# Patient Record
Sex: MALE | Race: WHITE | NOT HISPANIC OR LATINO | Employment: OTHER | URBAN - METROPOLITAN AREA
[De-identification: names, ages, dates, MRNs, and addresses within clinical notes are randomized per-mention and may not be internally consistent; named-entity substitution may affect disease eponyms.]

---

## 2017-02-05 ENCOUNTER — COMMUNICATION - RIVER FALLS (OUTPATIENT)
Dept: FAMILY MEDICINE | Facility: CLINIC | Age: 62
End: 2017-02-05

## 2017-02-05 ENCOUNTER — OFFICE VISIT - RIVER FALLS (OUTPATIENT)
Dept: FAMILY MEDICINE | Facility: CLINIC | Age: 62
End: 2017-02-05

## 2017-09-27 ENCOUNTER — OFFICE VISIT - RIVER FALLS (OUTPATIENT)
Dept: FAMILY MEDICINE | Facility: CLINIC | Age: 62
End: 2017-09-27

## 2017-09-27 ASSESSMENT — MIFFLIN-ST. JEOR: SCORE: 1590.51

## 2017-10-05 ENCOUNTER — AMBULATORY - RIVER FALLS (OUTPATIENT)
Dept: FAMILY MEDICINE | Facility: CLINIC | Age: 62
End: 2017-10-05

## 2017-10-10 ENCOUNTER — AMBULATORY - RIVER FALLS (OUTPATIENT)
Dept: FAMILY MEDICINE | Facility: CLINIC | Age: 62
End: 2017-10-10

## 2017-10-11 LAB
CHOLEST SERPL-MCNC: 200 MG/DL
CHOLEST/HDLC SERPL: 3.3 {RATIO}
CREAT SERPL-MCNC: 0.91 MG/DL (ref 0.7–1.25)
GLUCOSE BLD-MCNC: 98 MG/DL (ref 65–99)
HDLC SERPL-MCNC: 61 MG/DL
LDLC SERPL CALC-MCNC: 123 MG/DL
NONHDLC SERPL-MCNC: 139 MG/DL
TRIGL SERPL-MCNC: 65 MG/DL

## 2017-12-19 ENCOUNTER — OFFICE VISIT - RIVER FALLS (OUTPATIENT)
Dept: FAMILY MEDICINE | Facility: CLINIC | Age: 62
End: 2017-12-19

## 2018-04-13 ENCOUNTER — AMBULATORY - RIVER FALLS (OUTPATIENT)
Dept: FAMILY MEDICINE | Facility: CLINIC | Age: 63
End: 2018-04-13

## 2018-06-22 ENCOUNTER — AMBULATORY - RIVER FALLS (OUTPATIENT)
Dept: FAMILY MEDICINE | Facility: CLINIC | Age: 63
End: 2018-06-22

## 2018-08-02 ENCOUNTER — AMBULATORY - RIVER FALLS (OUTPATIENT)
Dept: FAMILY MEDICINE | Facility: CLINIC | Age: 63
End: 2018-08-02

## 2018-09-27 ENCOUNTER — AMBULATORY - RIVER FALLS (OUTPATIENT)
Dept: FAMILY MEDICINE | Facility: CLINIC | Age: 63
End: 2018-09-27

## 2019-04-29 ENCOUNTER — OFFICE VISIT - RIVER FALLS (OUTPATIENT)
Dept: FAMILY MEDICINE | Facility: CLINIC | Age: 64
End: 2019-04-29

## 2019-10-11 ENCOUNTER — AMBULATORY - RIVER FALLS (OUTPATIENT)
Dept: FAMILY MEDICINE | Facility: CLINIC | Age: 64
End: 2019-10-11

## 2020-09-15 ENCOUNTER — AMBULATORY - RIVER FALLS (OUTPATIENT)
Dept: FAMILY MEDICINE | Facility: CLINIC | Age: 65
End: 2020-09-15

## 2021-09-14 ENCOUNTER — COMMUNICATION - RIVER FALLS (OUTPATIENT)
Dept: FAMILY MEDICINE | Facility: CLINIC | Age: 66
End: 2021-09-14

## 2021-09-28 ENCOUNTER — AMBULATORY - RIVER FALLS (OUTPATIENT)
Dept: FAMILY MEDICINE | Facility: CLINIC | Age: 66
End: 2021-09-28

## 2021-11-02 ENCOUNTER — COMMUNICATION - RIVER FALLS (OUTPATIENT)
Dept: FAMILY MEDICINE | Facility: CLINIC | Age: 66
End: 2021-11-02

## 2021-11-08 ENCOUNTER — LAB REQUISITION (OUTPATIENT)
Dept: LAB | Facility: CLINIC | Age: 66
End: 2021-11-08
Payer: COMMERCIAL

## 2021-11-08 ENCOUNTER — OFFICE VISIT - RIVER FALLS (OUTPATIENT)
Dept: FAMILY MEDICINE | Facility: CLINIC | Age: 66
End: 2021-11-08

## 2021-11-08 DIAGNOSIS — U07.1 COVID-19: ICD-10-CM

## 2021-11-08 PROCEDURE — U0005 INFEC AGEN DETEC AMPLI PROBE: HCPCS | Mod: ORL | Performed by: EMERGENCY MEDICINE

## 2021-11-08 ASSESSMENT — MIFFLIN-ST. JEOR: SCORE: 1603.54

## 2021-11-09 ENCOUNTER — COMMUNICATION - RIVER FALLS (OUTPATIENT)
Dept: FAMILY MEDICINE | Facility: CLINIC | Age: 66
End: 2021-11-09

## 2021-11-10 ENCOUNTER — COMMUNICATION - RIVER FALLS (OUTPATIENT)
Dept: FAMILY MEDICINE | Facility: CLINIC | Age: 66
End: 2021-11-10

## 2021-11-10 LAB — SARS-COV-2 RNA RESP QL NAA+PROBE: NOT DETECTED

## 2021-11-11 LAB — SARS-COV-2 RNA RESP QL NAA+PROBE: NEGATIVE

## 2021-11-16 ENCOUNTER — COMMUNICATION - RIVER FALLS (OUTPATIENT)
Dept: FAMILY MEDICINE | Facility: CLINIC | Age: 66
End: 2021-11-16

## 2021-11-18 ENCOUNTER — COMMUNICATION - RIVER FALLS (OUTPATIENT)
Dept: FAMILY MEDICINE | Facility: CLINIC | Age: 66
End: 2021-11-18

## 2021-11-19 ENCOUNTER — COMMUNICATION - RIVER FALLS (OUTPATIENT)
Dept: FAMILY MEDICINE | Facility: CLINIC | Age: 66
End: 2021-11-19

## 2021-11-22 ENCOUNTER — COMMUNICATION - RIVER FALLS (OUTPATIENT)
Dept: FAMILY MEDICINE | Facility: CLINIC | Age: 66
End: 2021-11-22

## 2021-11-23 ENCOUNTER — COMMUNICATION - RIVER FALLS (OUTPATIENT)
Dept: FAMILY MEDICINE | Facility: CLINIC | Age: 66
End: 2021-11-23

## 2021-12-02 ENCOUNTER — COMMUNICATION - RIVER FALLS (OUTPATIENT)
Dept: FAMILY MEDICINE | Facility: CLINIC | Age: 66
End: 2021-12-02

## 2021-12-08 ENCOUNTER — COMMUNICATION - RIVER FALLS (OUTPATIENT)
Dept: FAMILY MEDICINE | Facility: CLINIC | Age: 66
End: 2021-12-08

## 2021-12-13 ENCOUNTER — COMMUNICATION - RIVER FALLS (OUTPATIENT)
Dept: FAMILY MEDICINE | Facility: CLINIC | Age: 66
End: 2021-12-13

## 2021-12-14 ENCOUNTER — AMBULATORY - RIVER FALLS (OUTPATIENT)
Dept: FAMILY MEDICINE | Facility: CLINIC | Age: 66
End: 2021-12-14

## 2021-12-15 ENCOUNTER — COMMUNICATION - RIVER FALLS (OUTPATIENT)
Dept: FAMILY MEDICINE | Facility: CLINIC | Age: 66
End: 2021-12-15

## 2021-12-15 LAB
CHOLEST SERPL-MCNC: 189 MG/DL
CHOLEST/HDLC SERPL: 3.8 {RATIO}
GLUCOSE BLD-MCNC: 102 MG/DL (ref 65–99)
HDLC SERPL-MCNC: 50 MG/DL
LDLC SERPL CALC-MCNC: 119 MG/DL
NONHDLC SERPL-MCNC: 139 MG/DL
TRIGL SERPL-MCNC: 102 MG/DL

## 2021-12-17 ENCOUNTER — COMMUNICATION - RIVER FALLS (OUTPATIENT)
Dept: FAMILY MEDICINE | Facility: CLINIC | Age: 66
End: 2021-12-17

## 2021-12-20 ENCOUNTER — AMBULATORY - RIVER FALLS (OUTPATIENT)
Dept: FAMILY MEDICINE | Facility: CLINIC | Age: 66
End: 2021-12-20

## 2021-12-21 ENCOUNTER — COMMUNICATION - RIVER FALLS (OUTPATIENT)
Dept: FAMILY MEDICINE | Facility: CLINIC | Age: 66
End: 2021-12-21

## 2021-12-21 LAB — SARS-COV-2 AB SERPL IA-ACNC: >150 [IU]/ML

## 2022-02-12 VITALS
OXYGEN SATURATION: 96 % | HEART RATE: 78 BPM | WEIGHT: 193.8 LBS | DIASTOLIC BLOOD PRESSURE: 80 MMHG | SYSTOLIC BLOOD PRESSURE: 132 MMHG | TEMPERATURE: 98.3 F

## 2022-02-12 VITALS
WEIGHT: 191.1 LBS | DIASTOLIC BLOOD PRESSURE: 78 MMHG | BODY MASS INDEX: 30.71 KG/M2 | OXYGEN SATURATION: 98 % | TEMPERATURE: 97.6 F | HEART RATE: 64 BPM | SYSTOLIC BLOOD PRESSURE: 130 MMHG | HEIGHT: 66 IN

## 2022-02-12 VITALS
HEIGHT: 67 IN | DIASTOLIC BLOOD PRESSURE: 72 MMHG | SYSTOLIC BLOOD PRESSURE: 116 MMHG | WEIGHT: 185.6 LBS | HEART RATE: 64 BPM | BODY MASS INDEX: 29.13 KG/M2

## 2022-02-15 NOTE — TELEPHONE ENCOUNTER
---------------------  From: John REAGAN, Diana ROSALES (Phone Messages Pool ([id number]_[facility]))   To: MARYANN FOSTER    Sent: 11/10/2021 3:20:09 PM CST  Subject: RE: COVID Test Results     Dr. Aleksey Ha is personally keeping an eye out for your COVID result. As of 20 minutes ago, there was no result yet.    I'm sorry for the delay,    Diana Lopez RN      ---------------------  From: MARYANN FOSTER  To: [facility]  Sent: 11/10/2021 02:55 p.m. CST  Subject: COVID Test Results  Hello:    I called earlier to inquire about the results of a COVID test on had administered on Monday, Nov. 8.  A nurse was going to look into it as she felt the results should be available by now.  Could you please confirm?    Thank you!

## 2022-02-15 NOTE — PROGRESS NOTES
Patient:   MARYANN FOSTER            MRN: [id number]            FIN: [id number]               Age:   61 years     Sex:  Male     :  1955   Associated Diagnoses:   Cold virus   Author:   Allan Santiago MD      Chief Complaint   2017 11:06 AM CST    Cold/URI x7 days.     Chief complaint and symptoms noted above confirmed with patient.      History of Present Illness             The patient presents with nasal congestion.  The location is both sides.  There were exacerbating factors.  It is described as a sensation of pressure.  The symptom occurs intermittently.  The course is worsening.        Review of Systems   Constitutional:  Fever.    Ear/Nose/Mouth/Throat:  Nasal congestion.    Respiratory:  Cough, No wheezing.       Health Status   Allergies:    Allergic Reactions (Selected)  No Known Medication Allergies   Medications:  (Selected)      Problem list:    No problem items selected or recorded.      Histories   Past Medical History:    No active or resolved past medical history items have been selected or recorded.   Family History:    No family history items have been selected or recorded.   Procedure history:    No active procedure history items have been selected or recorded.   Social History:             No active social history items have been recorded.      Physical Examination   Vital Signs   2017 11:06 AM CST Temperature Tympanic 98.3 DegF    Peripheral Pulse Rate 78 bpm    Systolic Blood Pressure 132 mmHg    Diastolic Blood Pressure 80 mmHg    Mean Arterial Pressure 97 mmHg    Oxygen Saturation 96 %      Measurements from flowsheet : Measurements   2017 11:06 AM CST    Weight Measured - Standard                193.8 lb     General:  Alert and oriented.    HENT:  Normocephalic.    Neck:  Supple, Non-tender, No lymphadenopathy.    Respiratory:  Lungs are clear to auscultation.    Cardiovascular:  Normal rate, Regular rhythm.       Impression and Plan   Diagnosis     Cold virus  ([id number]).     Plan:       Refer to: Reviewed benadryl dosing for symptom relief.    Orders     Orders   Requests (Lab):  Influenza A and B Antigen (Request) (Order): Priority: Urgent, Cold virus.     Orders     F/U in 48-72 hours if not improving, sooner if getting worse.

## 2022-02-15 NOTE — NURSING NOTE
Comprehensive Intake Entered On:  11/8/2021 8:13 AM CST    Performed On:  11/8/2021 8:05 AM CST by Nidia Raymond CMA               Summary   Chief Complaint :   Yearly px, is needing COVID test for upcoming surgery 11/11/21   Nidia Raymond CMA - 11/8/2021 8:05 AM CST   Weight Measured :   191.1 lb(Converted to: 191 lb 2 oz, 86.681 kg)      Height Measured :   66.25 in(Converted to: 5 ft 6 in, 168.27 cm)      Body Mass Index :   30.61 kg/m2 (HI)      Body Surface Area :   2.01 m2   Nidia Raymond CMA - 11/10/2021 1:08 PM CST     Systolic Blood Pressure :   130 mmHg   Diastolic Blood Pressure :   78 mmHg   Mean Arterial Pressure :   95 mmHg   Peripheral Pulse Rate :   64 bpm   BP Site :   Right arm   Pulse Site :   Radial artery   BP Method :   Manual   HR Method :   Electronic   Temperature Tympanic :   97.6 DegF(Converted to: 36.4 DegC)  (LOW)    Oxygen Saturation :   98 %   Segundo GIBSON Nidia - 11/8/2021 8:05 AM CST   Health Status   Allergies Verified? :   Yes   Medication History Verified? :   Yes   Medical History Verified? :   No   Pre-Visit Planning Status :   Completed   Tobacco Use? :   Former smoker   Nidia Raymond CMA - 11/8/2021 8:05 AM CST   Consents   Consent for Immunization Exchange :   Consent Granted   Consent for Immunizations to Providers :   Consent Granted   Segundo GIBSON Nidia - 11/8/2021 8:05 AM CST   Meds / Allergies   (As Of: 11/8/2021 8:13:45 AM CST)   Allergies (Active)   No known allergies  Estimated Onset Date:   Unspecified ; Created By:   Delmy Shaver CMA; Reaction Status:   Active ; Category:   Drug ; Substance:   No known allergies ; Type:   Allergy ; Updated By:   Delmy Shaver CMA; Reviewed Date:   11/8/2021 8:05 AM CST      No Known Medication Allergies  Estimated Onset Date:   Unspecified ; Created By:   Gabby Jovel; Reaction Status:   Active ; Category:   Drug ; Substance:   No Known Medication Allergies ; Type:   Allergy ; Updated By:   Gabby Jovel;  Reviewed Date:   11/8/2021 8:05 AM CST        Medication List   (As Of: 11/8/2021 8:13:45 AM CST)   Home Meds    aspirin  :   aspirin ; Status:   Documented ; Ordered As Mnemonic:   aspirin 81 mg oral delayed release tablet ; Simple Display Line:   81 mg, 1 tab(s), po, daily, 0 Refill(s) ; Catalog Code:   aspirin ; Order Dt/Tm:   9/27/2017 4:06:29 PM CDT            Social History   Social History   (As Of: 11/8/2021 8:13:45 AM CST)   Alcohol:        Current, 3-5 times per week, 1 drinks/episode average.  2 drinks/episode maximum.   (Last Updated: 9/29/2017 8:36:27 AM CDT by Gale Abraham)          Tobacco:        Former smoker, quit more than 30 days ago, Cigarettes   (Last Updated: 11/8/2021 8:05:45 AM CST by Nidia Raymond CMA)          Electronic Cigarette/Vaping:        Electronic Cigarette Use: Never.   (Last Updated: 11/8/2021 8:05:49 AM CST by Nidia Raymond CMA)          Substance Abuse:        Never.   (Last Updated: 11/8/2021 7:08:14 AM CST by Nidia Raymond CMA)          Employment/School:        Employed, Work/School description: Education.   (Last Updated: 9/29/2017 8:36:03 AM CDT by Gale Abraham)          Home/Environment:        Risks in environment: owns secured gun.   (Last Updated: 9/29/2017 8:36:19 AM CDT by Gale Abraham)          Nutrition/Health:        Type of diet: Regular.  Vitamin/Supplements: Multivitamin Male 50+.  Previous weight loss programs: Atkins-low carb variety.   (Last Updated: 11/8/2021 7:08:13 AM CST by Nidia Raymond CMA)          Exercise:        Exercise duration: 35.  Exercise frequency: 5-6 times/week.  Self assessment: Good condition.  Exercise type: Walking.   (Last Updated: 11/8/2021 7:08:12 AM CST by Nidia Raymond CMA)          Sexual:        Sexually active: Yes.  Sexual orientation: Straight or heterosexual.  Contraceptive Use Details: vasectomy.   (Last Updated: 9/29/2017 8:37:16 AM CDT by Gale Abraham)

## 2022-02-15 NOTE — TELEPHONE ENCOUNTER
---------------------  From: Anita Simon LPN (Phone Messages Pool ([id number]_[facility]))   To: Appointment Pool ([id number]_[place]);     Sent: 11/19/2021 9:46:37 AM CST  Subject: FW: COVID Booster Shot           ---------------------  From: MARYANN FOSTER  To: [facility]  Sent: 11/19/2021 09:34 a.m. CST  Subject: FW: COVID Booster Shot  Hello:    As my earlier message indicated, Dr. Ryder recommended that I receive a COVID booster as soon as possible due to my medical condition.    I called earlier today and learned that the first available appointment at your clinic would be December 3rd at 11 am.  However, I was able to schedule an appointment for a booster shot through [facility] for 11/22/21.  Therefore, please cancel my appointment on 12/3/21.    Thank you!  ---------------------  From: MARYANN FOSTER  To: [facility]  Sent: 11/18/2021 06:38 p.m. CST  Subject: COVID Booster Shot  Hello:  Dr. Ryder has recommended that I receive a COVID booster as soon as possible.  Please advise on when this could be scheduled.  I m mostly available on Friday--except 3-4:30 pm and Monday afternoon.  Thank you!Appointment cancelled.

## 2022-02-15 NOTE — TELEPHONE ENCOUNTER
---------------------  From: Nidia Raymond CMA (Phone Messages Pool ([id number]_[place]))   To: Appointment Pool ([id number]_[place]);     Sent: 11/19/2021 7:57:08 AM CST  Subject: FW: COVID Booster Shot         Please help pt schedule a COVID booster per [facility]  ---------------------  From: MARYANN FOSTER  To: [facility]  Sent: 11/18/2021 06:38 p.m. CST  Subject: COVID Booster Shot  Adonis:    Dr. Ryder has recommended that I receive a COVID booster as soon as possible.  Please advise on when this could be scheduled.  I m mostly available on Friday--except 3-4:30 pm and Monday afternoon.    Thank you!LVM x1 for pt to call and sched

## 2022-02-15 NOTE — TELEPHONE ENCOUNTER
---------------------  From: Jami Shukla RN (Phone Messages Pool ([id number]_[place]))   To: Appointment Pool ([id number]_[place]);     Sent: 11/9/2021 8:51:37 AM CST  Subject: CONSUMER MESSAGE  FW: Assign requested care physician           ---------------------  From: MARYANN FOSTER  To: [facility]  Sent: 11/09/2021 08:46 a.m. CST  Subject: Assign requested care physician  Hello:    Could you please assign Dr. Bill Ryder as my care physician in my records?  Thank you.Done

## 2022-02-15 NOTE — TELEPHONE ENCOUNTER
---------------------From: Jami Shukla RN (Phone Messages Pool ([id number]_[facility])) To: [facility] Message Pool ([id number]_[facility]);   Sent: 11/18/2021 11:41:47 AM CSTSubject: CONSUMER MESSAGE FW: Melanoma Pathology Report 11- ---------------------From: MARYANN FOSTERTo: [facility] CenterSent: 11/18/2021 11:26 a.m. CSTSubject: Melanoma Pathology Report [id number] forward attached pathology report to Dr. Bill Ryder.  He can call me at his convenience to discuss at [phone].Thank you.---------------------From: Nidia Raymond CMA (Segment Message Pool ([id number]_[facility])) To: Bill Ryder MD;   Sent: 11/18/2021 12:03:50 PM CSTSubject: FW: CONSUMER MESSAGE FW: Melanoma Pathology Report 11----------------------From: Bill Ryder MD To: Nidia Raymond CMA;   Sent: 11/18/2021 3:59:55 PM CSTSubject: RE: CONSUMER MESSAGE FW: Melanoma Pathology Report 11- Should be good for a booster and patient will schedule---------------------From: Nidia Raymond CMA To: MARYANN FOSTER  Sent: 11/18/2021 4:53:25 PM CSTSubject: FW: CONSUMER MESSAGE FW: Melanoma Pathology Report 11-

## 2022-02-15 NOTE — PROGRESS NOTES
Patient:   MARYANN FOSTER            MRN: [id number]            FIN: [id number]               Age:   66 years     Sex:  Male     :  1955   Associated Diagnoses:   Annual physical exam; Melanoma   Author:   Bill Ryder MD      Chief Complaint   2021 8:05 AM CST    Yearly px, is needing COVID test for upcoming surgery 21        Well Adult History     Has been getting annual skin exams and has several moles removed in past for the past 20 years. Had skin exam October with a biopsy returning as melanoma with deep margin positive. Currently considered T2aN0 or Stage Ib Melanoma but further testing to be done including sentinel lymph node. Planned for wide margin resection with sentinel lymph node as well.  Colonoscopy with 5mm tubular adenoma 2017  Partially detached retina 2018 right eye. Still has some floaters.    Denies heartburn (had it in his 40's)  Alcohol about 10 drinks a week  Nonsmoker  Takes fish oil      Review of Systems   Constitutional:  No fever, No weakness, No fatigue.    Eye:  No recent visual problem.    Ear/Nose/Mouth/Throat:  No ear pain, No tinnitus.    Respiratory:  No shortness of breath, No cough, No wheezing.    Cardiovascular:  No chest pain, No peripheral edema.    Gastrointestinal:  No nausea, No vomiting, No diarrhea.    Genitourinary:  No dysuria, No change in urine stream.    Hematology/Lymphatics:  No bruising tendency, No bleeding tendency.    Endocrine:  Negative.    Immunologic:  Negative.    Musculoskeletal:  No joint pain, No decreased range of motion.    Integumentary:  No rash.    Neurologic:  No numbness, No headache.    Psychiatric:  Negative.    All other systems reviewed and negative   Genitourinary: seldom nocturia. No problems with intercourse    No history of bleeding disorders or blood transfusion  No prior problems with anesthesia  No family history of anesthesia problems  No positive responses for sleep apnea  Denies plavix,  coumadin  Denies history of DVT/PE  Denies recent corticosteroid use    Able to walk a flight of stairs without chest pain or shortness of breath.      Health Status   Allergies:    Allergic Reactions (Selected)  No known allergies  No Known Medication Allergies   Medications:  (Selected)   Documented Medications  Documented  aspirin 81 mg oral delayed release tablet: 1 tab(s) ( 81 mg ), po, daily, 0 Refill(s), Type: Maintenance   Problem list:    No problem items selected or recorded.      Histories   Procedure history:    Colonoscopy (SNOMED CT 331931995) performed by Bill Ryder MD on 2017 at 62 Years.  Comments:  2017 2:31 PM CST - Acacia Vogel  Sedation:  Fentanyl and Versed  Polyps:  Tubular adenoma  F/u in 5 years  Hernia repair (SNOMED CT 70878868) in  at 50 Years.     Family History: Dad  2014 age 88 from  Pancreatic cancer. Mom alive. Brother and sons healthy  Social History:  (Verna) . 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 Had worked at [phone]. Son (Manuel) living in [place], [place] working for [employer] () and  with two dogs. Son (Hansel) living [place], [place] single as . Son Santino) lives in [place] in relationship with one daughter working at [employer]      Physical Examination   Vital Signs   2021 8:05 AM CST Temperature Tympanic 97.6 DegF  LOW    Peripheral Pulse Rate 64 bpm    Pulse Site Radial artery    HR Method Electronic    Systolic Blood Pressure 130 mmHg    Diastolic Blood Pressure 78 mmHg    Mean Arterial Pressure 95 mmHg    BP Site Right arm    BP Method Manual    Oxygen Saturation 98 %      Measurements from flowsheet : Measurements   2021 8:05 AM CST Height Measured - Standard 67 in    Weight Measured - Standard 66.25 lb    BSA 1.19 m2    Body Mass Index 10.38 kg/m2  LOW      General:  Alert and oriented, No acute distress.    Eye:  Normal conjunctiva.     HENT:  Tympanic membranes are clear, No pharyngeal erythema.    Neck:  No lymphadenopathy.    Respiratory:  Lungs are clear to auscultation.    Cardiovascular:  Normal rate, Regular rhythm, No murmur, No edema.    Gastrointestinal:  Soft, Non-tender, Non-distended.    Genitourinary:  Normal genitalia for age and sex.    Musculoskeletal:  Normal range of motion, Normal strength, No tenderness, Normal gait.    Integumentary:  Warm, No rash.    Neurologic:  Alert, Oriented, Normal sensory, Normal motor function, No focal deficits.    Psychiatric:  Cooperative, Appropriate mood & affect.       Impression and Plan   Diagnosis     Annual physical exam (UJS95-UA Z00.00).     Melanoma (IUC57-GJ C43.9).       No restrictions for planned surgery this week  Melanoma: planned wide excision and sentinel lymph node dissection  Cardiac: fasting labs. Discussed preventative aspirin and will discontinue for now  Colon Cancer Screen: Colonoscopy with small tubular adenoma 5mm and recommend repeat December 2025 given new guidelines  Prostate Cancer Screen: no family history. Discussed and declines. Handouts given  Immunizations: Coronavirus 2021. Prevnar 2017. Shingrix 2018. Adacel 2009 and will update after surgery.   Weight: stable from 2017    Last dose aspirin Saturday November 6th  Stop supplements before surgery

## 2022-02-15 NOTE — TELEPHONE ENCOUNTER
---------------------  From: Karli Jason RN (Phone Messages Pool ([id number]_[facility]))   To: Appointment Pool ([id number]_[place]);     Sent: 12/2/2021 3:43:42 PM CST  Subject: FW: FW: Adacel Vaccine     Please assist pt with scheduling procedure shot around 8:30 on 12/14/21.      ---------------------  From: MARYANN FOSTER  To: [facility]  Sent: 12/02/2021 03:32 p.m. CST  Subject: RE: FW: Adacel Vaccine  Could I request that I receive this vaccination when I come to an existing appointment for blood lab work at 8:30 am on December 14th?       Addendum by Karli Jason RN on December 02, 2021 3:15:08 PM CST  ---------------------  From: Karli Jason RN (Phone Messages Pool ([id number]_[place] [facility]))  To: MARYANN FOSTER  Sent: 12/2/2021 3:15:08 PM CST  Subject: FW: Adacel Vaccine       Addendum by Maykel Woods MD on December 02, 2021 3:09:27 PM CST  ---------------------  From: Maykel Woods MD  To: Phone Messages Pool ([id number] [facility] [place]);  Sent: 12/2/2021 3:09:27 PM CST  Subject: RE: Adacel Vaccine       Yes, we would still advise TDAP vaccination every 10 yrs beyond age of 64.  The 10 yr frequency is primarily directed toward tetanus updates, though nearly all of tetanus vaccinations are combined into a tetanus, pertussis, diphtheria  product (TDAP) and its simply easier to offer. Additionally, waning immunity related to pertussis has been observed in adult populations and additional boosters have been beneficial to reduce risk of pertussis infections.  ---------------------  From: Karli Jason RN (Phone Messages Pool ([id number]_[place] [facility] [place]))  To: Maykel Woods MD;  Sent: 12/2/2021 2:37:05 PM CST  Subject: FW: Adacel Vaccine       Donnell Lamb,       Dr. Ryder will be out of the clinic through Dec 8th. Your message is being forwarded to a provider in the clinic for review and advise on the  Adacel vaccine.       Thank you,  Karli DE LA TORRE RN            ---------------------  From: MARYANN  "RCISTIAN  To: [facility]  Sent: 12/02/2021 02:32 p.m. CST  Subject: Adacel Vaccine  Hello:  As a follow up to a recent physical that Dr. Ryder performed, he suggested that I receive the Adacel vaccine to up-date my immunizations. However, I note that the FDA offers the following guidance: \"Immunization for prevention of tetanus, diphtheria and pertussis as a single dose in persons 10 through 64 years of age.\"  I guess my question is do I still need this vaccination as I have reached 66 years of age?  Please advise.  Thank you!Called and LVM for Pt to call back for scheduling.  "

## 2022-02-15 NOTE — TELEPHONE ENCOUNTER
---------------------  From: Gale Taylor (Appointment Pool ([id number]_[place]))   To: MARYANN FOSTER    Sent: 2021 8:55:10 AM CDT  Subject: RE: Appointment Request     Good Morning Maryann,  I have you scheduled for your flu shot on 2021 at 9:40am. If this day and time does not work for you please let us know.  Thank you and have a great day.  Gale       ---------------------  From: MARYANN FOSTER  To: [facility]  Sent: 2021 08:14 a.m. CDT  Subject: Appointment Request  Patient Name: MARYANN FOSTER  Patient : 1955  Appointment Dates: Between Sep 20, 2021 and Oct 01, 2021  Preferred Days: Tuesday,Wednesday,Thursday  Preferred Time: Morning after 8 am or afternoon before 4:30 pm  Contact Patient by: Phone - [phone]    Reason for Visit:    Annual flu shot

## 2022-02-15 NOTE — TELEPHONE ENCOUNTER
---------------------  From: Eileen/Praveena BURLESON (Phone Messages Pool ([id number]_[facility]))   To: [facility] [employer] Pool ([id number]_[place] - [place]);     Sent: 12/17/2021 8:07:59 AM CST  Subject: FW: Antibody Testing           ---------------------  From: MARYANN FOSTER  To: [facility]  Sent: 12/16/2021 09:26 p.m. CST  Subject: Antibody Testing  Please ask Dr. Bill Ryder if he can prescribe an antibody test (e.g., [facility]) for me.  Given my CLL/SLL condition, it is recommended to determine of the COVID vaccinations I have received have generated a sufficient level of antibodies to protect me from infection. Thank you.---------------------  From: Nidia Raymond CMA ([employer] Message Pool ([id number]_[facility]))   To: Bill Ryder MD;     Sent: 12/17/2021 8:44:38 AM CST  Subject: FW: Antibody Testing---------------------  From: Bill Ryder MD   To: Nidia Raymond CMA;     Sent: 12/17/2021 1:25:09 PM CST  Subject: RE: Antibody Testing     Tomasa  Please place the order  Thanksorder placed please call and schedule a lab only apt---------------------  From: Nidia Raymond CMA   To: MARYANN FOSTER    Sent: 12/17/2021 2:27:50 PM CST  Subject: FW: Antibody Testing

## 2022-02-15 NOTE — TELEPHONE ENCOUNTER
---------------------  From: Bill Ryder MD   To: MARYANN FOSTER    Sent: 12/21/2021 2:08:51 PM CST  Subject: Lab     Dear Mr Simmons    You have antibodies to Coronavirus      Results:  Date Result Name Ind Value Ref Range   12/20/2021 2:30 PM Coronavirus (COVID-19) Ab IgG ((H)) >150.00 ( - <1.00)     Francisco Ryder M.D.

## 2022-02-15 NOTE — TELEPHONE ENCOUNTER
"---------------------  From: MARYANN FOSTER  To: [facility]  Sent: 12/02/2021 03:17 p.m. CST  Subject: RE: FW: Adacel Vaccine  Okay.  Thank you for the clarification.  ???  Addendum by Karli Jason RN on December 02, 2021 3:15:08 PM CST  ---------------------  From: Karli Jason RN ([id number])  To: MARYANN FOSTER  Sent: 12/2/2021 3:15:08 PM CST  Subject: FW: Adacel Vaccine  ???  Addendum by Maykel Woods MD on December 02, 2021 3:09:27 PM CST  ---------------------  From: Maykel Woods MD  To: [facility] [employer] Pool ([id number]);  Sent: 12/2/2021 3:09:27 PM CST  Subject: RE: Adacel Vaccine  ???  Yes, we would still advise TDAP vaccination every 10 yrs beyond age of 64.  The 10 yr frequency is primarily directed toward tetanus updates, though nearly all of tetanus vaccinations are combined into a tetanus, pertussis, diphtheria  product (TDAP) and its simply easier to offer. Additionally, waning immunity related to pertussis has been observed in adult populations and additional boosters have been beneficial to reduce risk of pertussis infections.  ---------------------  From: Karli Jason RN (Phone [employer] Pool ([id number]))  To: Maykel Woods MD;  Sent: 12/2/2021 2:37:05 PM CST  Subject: FW: Adacel Vaccine  ???  Donnell Lamb,  ???  Dr. Ryder will be out of the clinic through Dec 8th. Your message is being forwarded to a provider in the clinic for review and advise on the  Adacel vaccine.  ???  Thank you,  Krali DE LA TORRE RN  ???  ???  ---------------------  From: MARYANN FOSTER  To: [facility]  Sent: 12/02/2021 02:32 p.m. CST  Subject: Adacel Vaccine  Hello:  As a follow up to a recent physical that Dr. Ryder performed, he suggested that I receive the Adacel vaccine to up-date my immunizations. However, I note that the FDA offers the following guidance: \"Immunization for prevention of tetanus, diphtheria and " "pertussis as a single dose in persons 10 through 64 years of age.\"  I guess my question is do I still need this vaccination as I have reached 66 years of age?  Please advise.  Thank you!  "

## 2022-02-15 NOTE — TELEPHONE ENCOUNTER
---------------------  From: John REAGAN, Diana ROSALES (Phone Messages Pool ([id number]_[facility]))   To: Bernie Blackmon CMA;     Cc: JA[facility] Message Pool ([id number]_[place] - [place]);      Sent: 11/10/2021 11:55:31 AM CST  Subject: 2 things     Phone message    PCP:   JAISON      Time of Call:  1149       Person Calling:  Pt  Phone number:  [phone]    Returned call at: _    Note:   Pt calling with 2 issues.    1. He has surgery tomorrow and surgery center has not gotten COVID result from 11/8/21 yet. He is hoping this can be looked into.    2. The weight on his record from intake on 11/8/21 is 66.25 lbs. He cannot remember what his actual weight was, but knows it was not that. He is hoping it can be corrected.    Last office visit and reason:  11/8/21, annual pxFound paper from Monday and weight/height updated in chartCalled and notified pt of negative, COVID test and that weight was corrected, he verbalized a good understanding.

## 2022-02-15 NOTE — TELEPHONE ENCOUNTER
---------------------  From: MARYANN FOSTER  To: [facility]  Sent: 2021 09:04 a.m. CDT  Subject: RE: Appointment Request  This is good.  Thank you!  ---------------------  From: Gale Taylor (Appointment Pool ([id number]_[place]))  To: MARYANN FOSTER  Sent: 2021 8:55:10 AM CDT  Subject: RE: Appointment Request  ???  Good Morning Maryann,  I have you scheduled for your flu shot on 2021 at 9:40am. If this day and time does not work for you please let us know.  Thank you and have a great day.  Gale  ???  ???  ---------------------  From: MARYANN FOSTER  To: [facility]  Sent: 2021 08:14 a.m. CDT  Subject: Appointment Request  Patient Name: MARYANN FOSTER  Patient : 1955  Appointment Dates: Between Sep 20, 2021 and Oct 01, 2021  Preferred Days: Tuesday,Wednesday,Thursday  Preferred Time: Morning after 8 am or afternoon before 4:30 pm  Contact Patient by: Phone - [phone]  Reason for Visit:  Annual flu shot

## 2022-02-15 NOTE — TELEPHONE ENCOUNTER
---------------------  From: Bill Ryder MD   To: MARYANN FOSTER    Sent: 12/15/2021 9:03:37 AM CST  Subject: Labs     Dear Mr Simmons    Glucose in prediabetes range (100-125)  Cholesterol good and risk of heart attack 5.7% over 10 years      Results:  Date Result Name Ind Value Ref Range   12/14/2021 8:35 AM Cholesterol  189 mg/dL ( - <200)   12/14/2021 8:35 AM HDL  50 mg/dL (> OR = 40 - )   12/14/2021 8:35 AM Triglyceride  102 mg/dL ( - <150)   12/14/2021 8:35 AM LDL ((H)) 119    12/14/2021 8:35 AM Cholesterol/HDL Ratio  3.8 ( - <5.0)   12/14/2021 8:35 AM Non-HDL Cholesterol ((H)) 139 ( - <130)   12/14/2021 8:35 AM Glucose Level ((H)) 102 mg/dL (65 - 99)     Francisco Ryder M.D.

## 2022-02-15 NOTE — TELEPHONE ENCOUNTER
"---------------------  From: Karli Jason RN (Phone Messages Pool ([id number]_[place]))   To: Maykel Woods MD;     Sent: 12/2/2021 2:37:05 PM CST  Subject: FW: Adacel Vaccine     Adonis, Donnell,    Dr. Ryder will be out of the clinic through Dec 8th. Your message is being forwarded to a provider in the clinic for review and advise on the  Adacel vaccine.    Thank you,  Karli DE LA TORRE RN      ---------------------  From: MARYANN FOSTER  To: [facility]  Sent: 12/02/2021 02:32 p.m. CST  Subject: Adacel Vaccine  Hello:  As a follow up to a recent physical that Dr. Ryder performed, he suggested that I receive the Adacel vaccine to up-date my immunizations. However, I note that the FDA offers the following guidance: \"Immunization for prevention of tetanus, diphtheria and pertussis as a single dose in persons 10 through 64 years of age.\"    I guess my question is do I still need this vaccination as I have reached 66 years of age?    Please advise.    Thank you!---------------------  From: Maykel Woods MD   To: Phone Messages Pool ([id number] [place]);     Sent: 12/2/2021 3:09:27 PM CST  Subject: RE: Adacel Vaccine     Yes, we would still advise TDAP vaccination every 10 yrs beyond age of 64.  The 10 yr frequency is primarily directed toward tetanus updates, though nearly all of tetanus vaccinations are combined into a tetanus, pertussis, diphtheria  product (TDAP) and its simply easier to offer. Additionally, waning immunity related to pertussis has been observed in adult populations and additional boosters have been beneficial to reduce risk of pertussis infections.---------------------  From: Karli Jason RN (Phone Messages Pool ([id number]_[place]))   To: MARYANN FOSTER    Sent: 12/2/2021 3:15:08 PM CST  Subject: FW: Adacel Vaccine  "

## 2022-02-15 NOTE — TELEPHONE ENCOUNTER
---------------------  From: Vazquez REAGAN, Jami (Phone Messages Pool ([id number]_[facility]))   To: MARYANN FOSTER    Sent: 12/13/2021 3:19:19 PM CST  Subject: RE: Lab Work Appointment 12-14-21     Adonis Rivas,     You will need to come in fasting tomorrow, so please only have sips of water with any medications you need to take in the morning.      Thank you for checking!   Raj REAGAN            ---------------------  From: MARYANN FOSTER  To: [facility]  Sent: 12/13/2021 03:14 p.m. CST  Subject: Lab Work Appointment 12-14-21  Hello:    I have an appointment tomorrow morning to come in and have blood drawn as part of my recent physical.  Can you please advise on whether I can eat or drink anything in the morning prior?    Thank you!

## 2022-02-15 NOTE — LETTER
(Inserted Image. Unable to display)   November 24, 2021  MARYANN FOSTER  [address] [place] [address]        Dear MARYANN,    Thank you for selecting [facility] for your healthcare needs.    Our records indicate you are due for the following services:     Immunizations: Adacel Vaccine  Fasting Lab Tests ~ Please do not eat or drink anything 10 hours prior to your scheduled appointment time.  (Water and any medications that you may need are allowed unless directed otherwise.)     If you had your labs done at another facility or with Direct Access Lab Testing at [facility], please bring in a copy of the results to your next visit, mail a copy, or drop off a copy of your results to your Healthcare Provider.      (FYI   Regarding office visits: In some instances, a video visit or telephone visit may be offered as an option.)    To schedule an appointment or if you have further questions, please contact your clinic at [phone].    Powered by [employer]    Sincerely,    Bill Ryder MD

## 2022-02-15 NOTE — TELEPHONE ENCOUNTER
---------------------  From: Jami Shukla RN (Phone Messages Pool ([id number]_[facility]))   To: Bill Ryder MD;     Sent: 11/2/2021 12:08:55 PM CDT  Subject: CONSUMER MESSAGE  FW: Appointment 11/08/21           ---------------------  From: MARYANN FOSTER  To: [facility]  Sent: 11/02/2021 12:04 p.m. CDT  Subject: Appointment 11/08/21  Hello:    I have an appointment scheduled with Dr. Bill Ryder on Monday, November 8 at 8 am.  I would also like to request a COVID test at this appointment.  I have minor surgery scheduled with [facility] on Thursday, November 11th, and they have requested to receive the results.    Thank you!---------------------  From: Bill Ryder MD   To: Nidia Raymond CMA;     Sent: 11/2/2021 12:19:59 PM CDT  Subject: RE: CONSUMER MESSAGE  FW: Appointment 11/08/21     Noted

## 2022-02-15 NOTE — PROGRESS NOTES
Patient:   MARYANN FOSTER            MRN: [id number]            FIN: [id number]               Age:   61 years     Sex:  Male     :  1955   Associated Diagnoses:   Annual physical exam   Author:   Perry Simmons MD      Chief Complaint   2017 4:04 PM CDT    Annual Physical- Due Colonoscopy      History of Present Illness   See chief complaint as noted above and confirmed with the patient.    61 year old male presents today for a well adult exam.  He describes his current health status as good and stable.  Denies any effects on daily living in regards to his health status.  Patient's activity level is described a regular. Walks his dog and does some sit ups and push ups  and stretching at home.  Patient follows a low carb diet.  Patient has had  no hospitalizations or emergency department visits in the past year. Used tobacco in the past.  Drinks an average of 4 servings of alcohol on an occasional basis.  Moved from [place] to [place] in . Had labs done in [place] a few  years ago, cholesterol has been good in the past. Denies any remarkable family history. Father has passed he did have his Prostate removed when he was in his 50s. Mother is alive and heathy. Does wear sun screen when he is outside. 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         Todays weight 193 lbs.  Colon cancer screening status:  in [place]. He he had a polyp and states he is due for repeat.  Last Dental Exam:  Sees [facility] every 6 months. Has an appointment tomorrow.  Immunizations:  Due flu shot. Will be due Pneumonia and Shingles vaccine when he turns 62.      Review of Systems   Constitutional:  Negative.    Ear/Nose/Mouth/Throat:  Negative.    Respiratory:  Negative.    Cardiovascular:  Negative.    Gastrointestinal:  Negative.    Musculoskeletal:  Negative.    Integumentary:  Negative.    Neurologic:  Negative.    Psychiatric:  Negative.              Health Status   Allergies:    Allergic  Reactions (Selected)  No known allergies  No Known Medication Allergies   Medications:  (Selected)   Documented Medications  Documented  aspirin 81 mg oral delayed release tablet: 1 tab(s) ( 81 mg ), po, daily, 0 Refill(s), Type: Maintenance   Problem list:    No problem items selected or recorded.      Histories   Past Medical History:    No active or resolved past medical history items have been selected or recorded.   Family History:    No family history items have been selected or recorded.   Procedure history:    No active procedure history items have been selected or recorded.   Social History:             No active social history items have been recorded.      Physical Examination   Vital Signs   9/27/2017 4:04 PM CDT Peripheral Pulse Rate 64 bpm    Systolic Blood Pressure 116 mmHg    Diastolic Blood Pressure 72 mmHg    Mean Arterial Pressure 87 mmHg      Measurements from flowsheet : Measurements   9/27/2017 4:04 PM CDT Height Measured - Standard 67 in    Weight Measured - Standard 185.6 lb    BSA 1.99 m2    Body Mass Index 29.07 kg/m2      General:  Alert and oriented, No acute distress.    Eye:  Pupils are equal, round and reactive to light, Normal conjunctiva.    HENT:  Oral mucosa is moist.    Neck:  Supple.    Respiratory:  Respirations are non-labored.    Cardiovascular:  Normal rate, Regular rhythm, No edema.    Gastrointestinal:  Non-distended.    Musculoskeletal:  Normal gait.    Integumentary:  Warm, No rash.    Psychiatric:  Cooperative, Appropriate mood & affect, Normal judgment.       Review / Management   Results review      Impression and Plan   Diagnosis     Annual physical exam ([id number] Z00.00).     Plan:  Reviewed exercise and diet.    Discussed weight and weight loss;   Reviewed health maintenance and encouraged completion;  Discussed Hep C testing.   Will return for Lipid panel, chem 8, cbc and hep c testing.  Patient will be contacted with results.  Will schedule for colonoscopy. Will  update Flu vaccine today. Discussed returning for Pneumonia and shingles vaccines.  Follow up in 1 year for well adult exam and as needed concerns and problems.  Questions were answered regarding health, medication management and future expectations. .      I, Sophy Shaver [facility], acted solely as a scribe for, and in the presence of Dr. Perry Simmons who performed the service.

## 2022-03-15 ENCOUNTER — MYC MEDICAL ADVICE (OUTPATIENT)
Dept: FAMILY MEDICINE | Facility: CLINIC | Age: 67
End: 2022-03-15

## 2022-03-17 NOTE — TELEPHONE ENCOUNTER
Rufina is out of office until Monday, will ask for her thoughts when she returns.  Gale Zaman, ZuriD, [facility]  Medication Therapy Management Provider  Pager: [phone]

## 2022-03-22 NOTE — TELEPHONE ENCOUNTER
I'm back in clinic today.   I do not know at this time if/how our clinic gets access to [employer] for patient administration.    I have reached out to leadership to inquire about potential allocation from the state.    Rufina Mace, ZuriD  Medication Therapy Management (MTM) Pharmacist  [facility]  Pager: [phone]

## 2022-03-23 NOTE — TELEPHONE ENCOUNTER
Talked with patient and will talk with Oncologist about full of half dose and offered getting it at the [facility]. He will call back when has more information

## 2022-03-23 NOTE — TELEPHONE ENCOUNTER
Update on [employer] access:    Currently [facility] has outpt Patient access to [facility] at [facility] and the [facility].  (not our [facility] clinic).    Additionally - per the [facility] website on COVID-19 therapeutics, [facility] in [place] and [facility] in [place] have access to [employer].    Alternately, Patient could explore whether [facility] hematology provider (Dr. Pizano) could assist with access in [place].    Routing to PCP Dr. Ryder to determine next steps.   I would recommend clinic staff/PCP contacting Patient to inquire about preference of location.     Let me know if I can be of further assistance.    Rufina Mace, PharmD  Medication Therapy Management (MTM) Pharmacist  [facility]  Pager: [phone]

## 2022-03-23 NOTE — TELEPHONE ENCOUNTER
Discussed with Dr. Ryder -    I believe the [facility] in [place] has adult outpt visits for [place], but the [facility] site may be peds only.    If Patient wants to pursue through  [employer] [facility], then I can look into this further and identify the channels to get Patient access / ordering / scheduling, etc.    Per Dr. Ryder the Patient will be contacting us if would like to pursue with  [employer] [facility] and if that is the case Dr. Ryder will send back to me.    KB

## 2022-03-24 ENCOUNTER — TRANSFERRED RECORDS (OUTPATIENT)
Dept: HEALTH INFORMATION MANAGEMENT | Facility: CLINIC | Age: 67
End: 2022-03-24

## 2022-04-03 ENCOUNTER — HEALTH MAINTENANCE LETTER (OUTPATIENT)
Age: 67
End: 2022-04-03

## 2022-04-08 ENCOUNTER — ALLIED HEALTH/NURSE VISIT (OUTPATIENT)
Dept: FAMILY MEDICINE | Facility: CLINIC | Age: 67
End: 2022-04-08
Payer: COMMERCIAL

## 2022-04-08 DIAGNOSIS — Z23 HIGH PRIORITY FOR 2019-NCOV VACCINE: Primary | ICD-10-CM

## 2022-04-08 PROCEDURE — 0064A COVID-19,PF,MODERNA (18+ YRS BOOSTER .25ML): CPT | Performed by: EMERGENCY MEDICINE

## 2022-04-08 PROCEDURE — 91306 COVID-19,PF,MODERNA (18+ YRS BOOSTER .25ML): CPT | Performed by: EMERGENCY MEDICINE

## 2022-09-23 ENCOUNTER — IMMUNIZATION (OUTPATIENT)
Dept: FAMILY MEDICINE | Facility: CLINIC | Age: 67
End: 2022-09-23
Payer: COMMERCIAL

## 2022-09-23 PROCEDURE — 91313 COVID-19,PF,MODERNA BIVALENT: CPT

## 2022-09-23 PROCEDURE — 0134A COVID-19,PF,MODERNA BIVALENT: CPT

## 2022-12-24 ENCOUNTER — HEALTH MAINTENANCE LETTER (OUTPATIENT)
Age: 67
End: 2022-12-24

## 2023-05-02 ENCOUNTER — IMMUNIZATION (OUTPATIENT)
Dept: FAMILY MEDICINE | Facility: CLINIC | Age: 68
End: 2023-05-02
Payer: COMMERCIAL

## 2023-05-02 PROCEDURE — 91313 COVID-19 BIVALENT 18+ (MODERNA): CPT

## 2023-05-02 PROCEDURE — 0134A COVID-19 BIVALENT 18+ (MODERNA): CPT

## 2024-02-03 ENCOUNTER — HEALTH MAINTENANCE LETTER (OUTPATIENT)
Age: 69
End: 2024-02-03

## 2025-03-02 ENCOUNTER — HEALTH MAINTENANCE LETTER (OUTPATIENT)
Age: 70
End: 2025-03-02